# Patient Record
Sex: FEMALE | Race: BLACK OR AFRICAN AMERICAN | URBAN - METROPOLITAN AREA
[De-identification: names, ages, dates, MRNs, and addresses within clinical notes are randomized per-mention and may not be internally consistent; named-entity substitution may affect disease eponyms.]

---

## 2022-11-28 ENCOUNTER — APPOINTMENT (OUTPATIENT)
Dept: ULTRASOUND IMAGING | Age: 42
End: 2022-11-28
Payer: COMMERCIAL

## 2022-11-28 ENCOUNTER — APPOINTMENT (OUTPATIENT)
Dept: CT IMAGING | Age: 42
End: 2022-11-28
Payer: COMMERCIAL

## 2022-11-28 ENCOUNTER — HOSPITAL ENCOUNTER (EMERGENCY)
Age: 42
Discharge: HOME | End: 2022-11-28
Attending: EMERGENCY MEDICINE
Payer: COMMERCIAL

## 2022-11-28 VITALS
SYSTOLIC BLOOD PRESSURE: 132 MMHG | OXYGEN SATURATION: 100 % | RESPIRATION RATE: 16 BRPM | HEART RATE: 65 BPM | TEMPERATURE: 97.1 F | DIASTOLIC BLOOD PRESSURE: 88 MMHG

## 2022-11-28 DIAGNOSIS — R93.89 ENDOMETRIAL STRIPE INCREASED: ICD-10-CM

## 2022-11-28 DIAGNOSIS — R10.32 ABDOMINAL PAIN, LEFT LOWER QUADRANT: Primary | ICD-10-CM

## 2022-11-28 LAB
A/G RATIO: 1.2 (ref 1.1–2.2)
ALBUMIN SERPL-MCNC: 4.2 G/DL (ref 3.4–5)
ALP BLD-CCNC: 72 U/L (ref 40–129)
ALT SERPL-CCNC: 20 U/L (ref 10–40)
ANION GAP SERPL CALCULATED.3IONS-SCNC: 12 MMOL/L (ref 3–16)
AST SERPL-CCNC: 19 U/L (ref 15–37)
BACTERIA: ABNORMAL /HPF
BASOPHILS ABSOLUTE: 0.1 K/UL (ref 0–0.2)
BASOPHILS RELATIVE PERCENT: 0.7 %
BILIRUB SERPL-MCNC: 0.6 MG/DL (ref 0–1)
BILIRUBIN URINE: NEGATIVE
BLOOD, URINE: NEGATIVE
BUN BLDV-MCNC: 9 MG/DL (ref 7–20)
CALCIUM SERPL-MCNC: 9.1 MG/DL (ref 8.3–10.6)
CHLORIDE BLD-SCNC: 103 MMOL/L (ref 99–110)
CLARITY: ABNORMAL
CO2: 23 MMOL/L (ref 21–32)
COLOR: ABNORMAL
CREAT SERPL-MCNC: 0.7 MG/DL (ref 0.6–1.1)
EOSINOPHILS ABSOLUTE: 0.1 K/UL (ref 0–0.6)
EOSINOPHILS RELATIVE PERCENT: 0.6 %
EPITHELIAL CELLS, UA: 16 /HPF (ref 0–5)
GFR SERPL CREATININE-BSD FRML MDRD: >60 ML/MIN/{1.73_M2}
GLUCOSE BLD-MCNC: 101 MG/DL (ref 70–99)
GLUCOSE URINE: NEGATIVE MG/DL
HCG(URINE) PREGNANCY TEST: NEGATIVE
HCT VFR BLD CALC: 43 % (ref 36–48)
HEMOGLOBIN: 14.3 G/DL (ref 12–16)
KETONES, URINE: ABNORMAL MG/DL
LEUKOCYTE ESTERASE, URINE: NEGATIVE
LIPASE: 20 U/L (ref 13–60)
LYMPHOCYTES ABSOLUTE: 2 K/UL (ref 1–5.1)
LYMPHOCYTES RELATIVE PERCENT: 14.1 %
MCH RBC QN AUTO: 30.3 PG (ref 26–34)
MCHC RBC AUTO-ENTMCNC: 33.3 G/DL (ref 31–36)
MCV RBC AUTO: 91.2 FL (ref 80–100)
MICROSCOPIC EXAMINATION: YES
MONOCYTES ABSOLUTE: 0.9 K/UL (ref 0–1.3)
MONOCYTES RELATIVE PERCENT: 6.3 %
NEUTROPHILS ABSOLUTE: 11.2 K/UL (ref 1.7–7.7)
NEUTROPHILS RELATIVE PERCENT: 78.3 %
NITRITE, URINE: NEGATIVE
PDW BLD-RTO: 14 % (ref 12.4–15.4)
PH UA: 5.5 (ref 5–8)
PLATELET # BLD: 233 K/UL (ref 135–450)
PMV BLD AUTO: 9.1 FL (ref 5–10.5)
POTASSIUM SERPL-SCNC: 4.1 MMOL/L (ref 3.5–5.1)
PROTEIN UA: 30 MG/DL
RBC # BLD: 4.71 M/UL (ref 4–5.2)
RBC UA: 2 /HPF (ref 0–4)
SODIUM BLD-SCNC: 138 MMOL/L (ref 136–145)
SPECIFIC GRAVITY UA: 1.03 (ref 1–1.03)
TOTAL PROTEIN: 7.6 G/DL (ref 6.4–8.2)
URINE REFLEX TO CULTURE: ABNORMAL
URINE TYPE: ABNORMAL
UROBILINOGEN, URINE: 1 E.U./DL
WBC # BLD: 14.3 K/UL (ref 4–11)
WBC UA: 6 /HPF (ref 0–5)

## 2022-11-28 PROCEDURE — 6360000004 HC RX CONTRAST MEDICATION: Performed by: EMERGENCY MEDICINE

## 2022-11-28 PROCEDURE — 99285 EMERGENCY DEPT VISIT HI MDM: CPT

## 2022-11-28 PROCEDURE — 81001 URINALYSIS AUTO W/SCOPE: CPT

## 2022-11-28 PROCEDURE — 80053 COMPREHEN METABOLIC PANEL: CPT

## 2022-11-28 PROCEDURE — 83690 ASSAY OF LIPASE: CPT

## 2022-11-28 PROCEDURE — 84703 CHORIONIC GONADOTROPIN ASSAY: CPT

## 2022-11-28 PROCEDURE — 74177 CT ABD & PELVIS W/CONTRAST: CPT

## 2022-11-28 PROCEDURE — 76856 US EXAM PELVIC COMPLETE: CPT

## 2022-11-28 PROCEDURE — 85025 COMPLETE CBC W/AUTO DIFF WBC: CPT

## 2022-11-28 RX ORDER — DICYCLOMINE HCL 20 MG
20 TABLET ORAL 4 TIMES DAILY PRN
Qty: 30 TABLET | Refills: 0 | Status: SHIPPED | OUTPATIENT
Start: 2022-11-28

## 2022-11-28 RX ADMIN — IOPAMIDOL 75 ML: 755 INJECTION, SOLUTION INTRAVENOUS at 12:33

## 2022-11-28 ASSESSMENT — PAIN SCALES - GENERAL
PAINLEVEL_OUTOF10: 4
PAINLEVEL_OUTOF10: 4

## 2022-11-28 ASSESSMENT — PAIN DESCRIPTION - PAIN TYPE
TYPE: CHRONIC PAIN
TYPE: CHRONIC PAIN

## 2022-11-28 ASSESSMENT — PAIN - FUNCTIONAL ASSESSMENT
PAIN_FUNCTIONAL_ASSESSMENT: 0-10
PAIN_FUNCTIONAL_ASSESSMENT: 0-10

## 2022-11-28 ASSESSMENT — PAIN DESCRIPTION - LOCATION
LOCATION: ABDOMEN
LOCATION: ABDOMEN

## 2022-11-28 NOTE — ED PROVIDER NOTES
[address] [facility]  eMERGENCY dEPARTMENT eNCOUnter      Pt Name: Kentrell De eLon  MRN: [id number]  [place] 1980  Date of evaluation: 11/28/2022  Provider: Lizzy Jamison MD    formerly [facility]       Chief Complaint   Patient presents with    Abdominal Pain     Lower, ongoing issue     Back Pain     Lower back          CRITICAL CARE TIME   Total Critical Care time was 0 minutes, excluding separately reportable procedures. There was a high probability of clinically significant/life threatening deterioration in the patient's condition which required my urgent intervention. HISTORY OF PRESENT ILLNESS  (Location/Symptom, Timing/Onset, Context/Setting, Quality, Duration, Modifying Factors, Severity.)   Kentrell De Leon is a 38 y.o. female who presents to the emergency department complaining of left lower abdominal and left low back pain. She states has been going on for about 3 months. She notices on a daily basis. It seems to be getting worse. She states her bowel movements have never been regular. She had a bowel movement yesterday. She states sometimes they are hard, she has trouble with constipation. She states her last week and a half or so they have been fairly normal.  She states she saw her primary care physician. She has been referred to a GI doctor. She states she believes she has an appointment the end of this week. Since her symptoms were worse she decided to come in. She is status postcholecystectomy. She has had C-sections x3. She had an ablation. She has no frequency urgency or dysuria. Nursing Notes were reviewed and I agree. REVIEW OF SYSTEMS    (2-9 systems for level 4, 10 or more for level 5)     All: No fever or chills. HEENT: No earache rhinorrhea or sore throat. Cardiovascular: No chest pain. Pulmonary: No shortness of breath. GI: Left lower abdominal pain, somewhat crampy, occurs daily, some left lower back discomfort.   No nausea or vomiting. : No frequency urgency or dysuria. Status post ablation, no menses. Musculoskeletal: Left lower back pain. Nonradiating. Except as noted above the remainder of the review of systems was reviewed and negative. PAST MEDICAL HISTORY     Past Medical History:   Diagnosis Date    Diabetes mellitus ([address]     gestational diabetic last 2 pregnancies    UTI (lower urinary tract infection)          SURGICAL HISTORY       Past Surgical History:   Procedure Laterality Date    BREAST REDUCTION SURGERY       SECTION  ,    CHOLECYSTECTOMY, LAPAROSCOPIC      WISDOM TOOTH EXTRACTION           CURRENT MEDICATIONS       Previous Medications    FAMOTIDINE (PEPCID) 40 MG TABLET    Take 1 tablet by mouth daily       ALLERGIES     Patient has no known allergies. FAMILY HISTORY     No family history on file. SOCIAL HISTORY       Social History     Socioeconomic History    Marital status: Single   Tobacco Use    Smoking status: Never   Substance and Sexual Activity    Alcohol use: No    Drug use: No         PHYSICAL EXAM    (up to 7 for level 4, 8 or more for level 5)     ED Triage Vitals [22 1057]   BP Temp Temp Source Heart Rate Resp SpO2 Height Weight   -- 97.1 °F (36.2 °C) Temporal 70 17 99 % -- --       Alert moderately obese black female no acute distress. HEENT: Atraumatic. Pupils equal round reactive. No icterus. Nose is clear. Oropharynx moist without erythema. Neck: Supple, nontender, no adenopathy. Heart: Regular rate and rhythm. No murmurs or gallops noted. Lungs: Breath sounds equal bilaterally and clear. Abdomen: Obese, soft, mild poorly localized left lower abdominal tenderness. No guarding or rebound. No masses organomegaly. Bowel sounds are normal.  No flank tenderness. Skin: Warm and dry, good turgor. No pallor or cyanosis. No diaphoresis. Neuro: Awake, alert, oriented. No focal motor deficits. Normal gait.       DIFFERENTIAL DIAGNOSIS   Differential includes but is not limited to diverticulitis, ureterolithiasis, pyelonephritis, irritable bowel syndrome, constipation, other. DIAGNOSTIC RESULTS     EKG: All EKG's are interpreted by Moy Knapp MD in the absence of a cardiologist.      RADIOLOGY:   Non-plain film images such as CT, Ultrasound and MRI are read by the radiologist. Plain radiographic images are visualized and preliminarily interpreted Moy Knapp MD with the below findings:        Interpretation per the Radiologist below, if available at the time of this note:    US PELVIS COMPLETE   Preliminary Result   Endometrial stripe is thickened, measuring 19 mm. No increased internal   vascularity is seen. Consider a follow-up ultrasound in 6-12 weeks. 1.9 x 2.8 x 2.4 cm uterine fibroid. Unremarkable ultrasound appearance of the ovaries with normal color Doppler   flow. No significant free fluid is seen. CT ABDOMEN PELVIS W IV CONTRAST Additional Contrast? None   Final Result   Fluid in the fundal portion of the stripe and adjacent anterior myometrium. There is also adjacent enhancement of the submucosa and myometrium. Endometritis is considered. However, the fluid may be physiological and   enhancement pattern of the uterus is nonspecific. Small amount of free fluid in the pelvis. No extra uterine abscess is   identified.                ED BEDSIDE ULTRASOUND:   Performed by ED Physician - none    LABS:  Labs Reviewed   CBC WITH AUTO DIFFERENTIAL - Abnormal; Notable for the following components:       Result Value    WBC 14.3 (*)     Neutrophils Absolute 11.2 (*)     All other components within normal limits   COMPREHENSIVE METABOLIC PANEL - Abnormal; Notable for the following components:    Glucose 101 (*)     All other components within normal limits   URINALYSIS WITH REFLEX TO CULTURE - Abnormal; Notable for the following components:    Color, UA DARK YELLOW (*)     Clarity, UA CLOUDY (*) Ketones, Urine TRACE (*)     Protein, UA 30 (*)     All other components within normal limits   MICROSCOPIC URINALYSIS - Abnormal; Notable for the following components:    Bacteria, UA 1+ (*)     WBC, UA 6 (*)     Epithelial Cells, UA 16 (*)     All other components within normal limits   LIPASE   PREGNANCY, URINE       All other labs were within normal range or not returned as of this dictation. EMERGENCY DEPARTMENT COURSE and DIFFERENTIAL DIAGNOSIS/MDM:   Vitals:    Vitals:    11/28/22 1057 11/28/22 1532   BP: 107/69 132/88   Pulse: 70 65   Resp: 17 16   Temp: 97.1 °F (36.2 °C)    TempSrc: Temporal    SpO2: 99% 100%       Patient presents with symptoms as above. Is been going on for several months. She has a referral to a GI physician, she has an appointment on Friday. No  symptoms. She has had an ablation done. She does not have menses. Urinalysis shows 6 white cells with some ileal cells, likely contaminated specimen. Her H&H is stable. Her white blood cell count is elevated. The only abnormality noted on her CT abdomen pelvis was an enlarged endometrial stripe with some question of some inflammatory changes. Her ultrasound showed an enlarged endometrial stripe. She has no significant tenderness in the suprapubic area. She has no vaginal discharge. She has no vaginal bleeding. I think this can be followed up as an outpatient with her gynecologist at the [facility] clinic. She has a scheduled appoint with her GI physician on Friday. Further work-up for left lower quadrant abdominal pain at that time. I am going to put her on Bentyl for her abdominal discomfort. Test results, diagnosis, and treatment plan were discussed the patient. She understands treatment plan follow-up as discussed        CONSULTS:  None    PROCEDURES:  None    FINAL IMPRESSION      1. Abdominal pain, left lower quadrant    2.  Endometrial stripe increased          DISPOSITION/PLAN   DISPOSITION Decision To Discharge 11/28/2022 03:52:31 PM      PATIENT REFERRED TO:  [facility] [address] / GYN    Schedule an appointment as soon as possible for a visit in 1 week  Enlarged uterine lining    GI    In 4 days  as scheduled    DISCHARGE MEDICATIONS:  New Prescriptions    DICYCLOMINE (BENTYL) 20 MG TABLET    Take 1 tablet by mouth 4 times daily as needed (abd pain)       (Please note that portions of this note were completed with a voice recognition program.  Efforts were made to edit the dictations but occasionally words are mis-transcribed.)    Lila Brunner MD  Attending Emergency Physician        Chitra Lobtao MD  11/28/22 [id number]

## 2022-11-28 NOTE — DISCHARGE INSTRUCTIONS
Take Bentyl As needed for pain. Keep your scheduled appointment with your GI physician on Friday.     Make an appointment with your gynecologist at [facility] clinic for follow-up for your abnormal ultrasound/enlarged endometrial stripe

## 2023-08-10 ENCOUNTER — APPOINTMENT (OUTPATIENT)
Dept: GENERAL RADIOLOGY | Age: 43
End: 2023-08-10
Payer: COMMERCIAL

## 2023-08-10 ENCOUNTER — HOSPITAL ENCOUNTER (EMERGENCY)
Age: 43
Discharge: HOME | End: 2023-08-10
Payer: COMMERCIAL

## 2023-08-10 VITALS
OXYGEN SATURATION: 99 % | RESPIRATION RATE: 17 BRPM | HEART RATE: 55 BPM | WEIGHT: 212.74 LBS | TEMPERATURE: 97.7 F | BODY MASS INDEX: 37.7 KG/M2 | HEIGHT: 63 IN | DIASTOLIC BLOOD PRESSURE: 64 MMHG | SYSTOLIC BLOOD PRESSURE: 116 MMHG

## 2023-08-10 DIAGNOSIS — L03.011 PARONYCHIA OF FINGER OF RIGHT HAND: Primary | ICD-10-CM

## 2023-08-10 PROCEDURE — 26010 DRAINAGE OF FINGER ABSCESS: CPT

## 2023-08-10 PROCEDURE — 99283 EMERGENCY DEPT VISIT LOW MDM: CPT

## 2023-08-10 PROCEDURE — 73140 X-RAY EXAM OF FINGER(S): CPT

## 2023-08-10 RX ORDER — BACITRACIN ZINC AND POLYMYXIN B SULFATE 500; 1000 [USP'U]/G; [USP'U]/G
OINTMENT TOPICAL
Qty: 28.4 G | Refills: 0 | Status: SHIPPED | OUTPATIENT
Start: 2023-08-10 | End: 2023-08-17

## 2023-08-10 RX ORDER — DOXYCYCLINE HYCLATE 100 MG
100 TABLET ORAL 2 TIMES DAILY
Qty: 20 TABLET | Refills: 0 | Status: SHIPPED | OUTPATIENT
Start: 2023-08-10 | End: 2023-08-20

## 2023-08-10 RX ORDER — AMOXICILLIN AND CLAVULANATE POTASSIUM 875; 125 MG/1; MG/1
1 TABLET, FILM COATED ORAL 2 TIMES DAILY
Qty: 20 TABLET | Refills: 0 | Status: SHIPPED | OUTPATIENT
Start: 2023-08-10 | End: 2023-08-20

## 2023-08-10 ASSESSMENT — PAIN SCALES - GENERAL
PAINLEVEL_OUTOF10: 8
PAINLEVEL_OUTOF10: 6

## 2023-08-10 ASSESSMENT — PAIN - FUNCTIONAL ASSESSMENT: PAIN_FUNCTIONAL_ASSESSMENT: 0-10

## 2023-08-10 ASSESSMENT — LIFESTYLE VARIABLES
HOW MANY STANDARD DRINKS CONTAINING ALCOHOL DO YOU HAVE ON A TYPICAL DAY: 1 OR 2
HOW OFTEN DO YOU HAVE A DRINK CONTAINING ALCOHOL: MONTHLY OR LESS

## 2023-08-10 ASSESSMENT — PAIN DESCRIPTION - ORIENTATION: ORIENTATION: RIGHT

## 2023-08-10 ASSESSMENT — PAIN DESCRIPTION - DESCRIPTORS: DESCRIPTORS: THROBBING;SHARP

## 2023-08-10 ASSESSMENT — PAIN DESCRIPTION - LOCATION: LOCATION: FINGER (COMMENT WHICH ONE)

## 2023-08-10 NOTE — ED PROVIDER NOTES
[address] [facility] [address]  Dept: [phone]  Loc: [address] [facility] ENCOUNTER        This patient was not seen or evaluated by the attending physician. I evaluated this patient, the attending physician was available for consultation. CHIEF COMPLAINT    Chief Complaint   Patient presents with    Finger Pain     Onset a couple weeks ago went to urgent care DX right middle finger infection from a hang nail per patient and given ABX and told to soak in Epson salt water; over the past week after ABX finished pain has increased and moved down the finger and complaining of numbness/tingling and sharp pain when touched; denies fever, chills, SOB or chest pain;  denies n/v/d; able to move finger w/o difficulties for the most pain;       HPI    Raad Magallanes is a 41 y.o. female who presents with an area of erythema and fluctuance along the nail of the right middle digit. Onset was few weeks ago. She was seen in urgent care a week ago, started on Keflex but no I&D was performed. She has been soaking the finger in Epsom salt 3 times a day. However is gotten worse, she now has an area of fluctuance/abscess developed. The duration has been constant since the onset. The patient has associated pain. The pain severity is 6/10. There are no alleviating factors. The patient denies any fevers or chills. Came to the ED for further evaluation and treatment.     REVIEW OF SYSTEMS    Constitutional: no fever, chills or sweats  Respiratory:  No shortness of breath   Skin: see HPI    PAST MEDICAL HISTORY/SURGICAL HISTORY     Past Medical History:   Diagnosis Date    Diabetes mellitus ([address])     gestational diabetic last 2 pregnancies    UTI (lower urinary tract infection)      Past Surgical History:   Procedure Laterality Date    BREAST REDUCTION SURGERY       SECTION  5447,3247    CHOLECYSTECTOMY, LAPAROSCOPIC tingling distally. Came to the ED for further evaluation and treatment    Vitals upon arrival show normotensive, afebrile and hemodynamically stable. Nontoxic in appearance. Physical Exam:   Integument:  +fluctuance, erythema and tenderness located along the lateral fold of the right middle digit. No overt felon present. No red streaking up the finger nor in the hand  Vascular: radial pulse 2+ on the affected side     Medications ordered: Bupivacaine 0.5% without epinephrine for the digital block, see procedure, injected intradermally    Ddx includes: felon, cellulitis, gout, osteomyelitis, other    Blood work was done and shows:   N/A    Covid/Flu: N/A    Urinalysis: N/A    EKG per my interpretation: N/A    Imaging was reviewed and interpreted by radiologist as above showing:   -Soft tissue swelling consistent with her paronychia without any evidence of underlying osseous abnormality as interpreted per myself and confirmed with radiology read    Imaging was independently reviewed by myself. Consults: n.a    Reassessment: Patient tolerated procedure's well. Anesthesia via digital block was achieved, the paronychia was excised and drained. Clean dry and intact dressing applied. She will be started on Augmentin for floral and MRSA coverage, with doxycycline for MSSA coverage. Will cover her for 10-day course of antibiotics. Told to use warm compresses on the site. It may continue to drain as it was left open. It was not packed. She verbalized understanding for strict return parameters back to the ED for any new or worsening symptoms but otherwise can follow-up with her PCP.   She is in agreement with this plan as well as plan of discharge through shared decision making conversation with patient    Disposition: discharge     I estimate there is LOW risk for COMPARTMENT SYNDROME, DEEP VENOUS THROMBOSIS, SEPTIC ARTHRITIS, TENDON OR NEUROVASCULAR INJURY, thus I consider the discharge disposition

## 2023-08-10 NOTE — ED TRIAGE NOTES
Patient arrived to the ED ambulatory from home w/ complaints of finger infection on the right middle finger. Patient/EMS reports states Onset a couple weeks ago went to urgent care DX right middle finger infection from a hang nail per patient and given ABX and told to soak in Epson salt water; over the past week after ABX finished pain has increased and moved down the finger and complaining of numbness/tingling and sharp pain when touched; denies fever, chills, SOB or chest pain;  denies n/v/d; able to move finger w/o difficulties for the most pain;     Patient A&O x 4, VSS ,

## 2023-12-29 ENCOUNTER — HOSPITAL ENCOUNTER (EMERGENCY)
Age: 43
Discharge: HOME | End: 2023-12-29
Attending: EMERGENCY MEDICINE
Payer: COMMERCIAL

## 2023-12-29 ENCOUNTER — APPOINTMENT (OUTPATIENT)
Dept: GENERAL RADIOLOGY | Age: 43
End: 2023-12-29
Payer: COMMERCIAL

## 2023-12-29 VITALS
SYSTOLIC BLOOD PRESSURE: 156 MMHG | DIASTOLIC BLOOD PRESSURE: 72 MMHG | BODY MASS INDEX: 40.82 KG/M2 | HEIGHT: 63 IN | HEART RATE: 50 BPM | RESPIRATION RATE: 16 BRPM | TEMPERATURE: 97.9 F | WEIGHT: 230.38 LBS | OXYGEN SATURATION: 100 %
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
## 2023-12-29 DIAGNOSIS — M25.561 ACUTE PAIN OF RIGHT KNEE: Primary | ICD-10-CM

## 2023-12-29 PROCEDURE — 6370000000 HC RX 637 (ALT 250 FOR IP): Performed by: EMERGENCY MEDICINE

## 2023-12-29 PROCEDURE — 99283 EMERGENCY DEPT VISIT LOW MDM: CPT

## 2023-12-29 PROCEDURE — 73562 X-RAY EXAM OF KNEE 3: CPT

## 2023-12-29 RX ORDER — IBUPROFEN 400 MG/1
400 TABLET ORAL ONCE
Status: COMPLETED | OUTPATIENT
Start: 2023-12-29 | End: 2023-12-29

## 2023-12-29 RX ORDER — MELOXICAM 7.5 MG/1
7.5-15 TABLET ORAL DAILY
Qty: 60 TABLET | Refills: 0 | Status: SHIPPED | OUTPATIENT
Start: 2023-12-29 | End: 2024-01-28

## 2023-12-29 RX ORDER — LIDOCAINE 4 G/G
1 PATCH TOPICAL ONCE
Status: DISCONTINUED | OUTPATIENT
Start: 2023-12-29 | End: 2023-12-29 | Stop reason: HOSPADM

## 2023-12-29 RX ORDER — ACETAMINOPHEN 325 MG/1
650 TABLET ORAL ONCE
Status: COMPLETED | OUTPATIENT
Start: 2023-12-29 | End: 2023-12-29

## 2023-12-29 RX ORDER — LIDOCAINE 50 MG/G
1 PATCH TOPICAL DAILY
Qty: 10 PATCH | Refills: 0 | Status: SHIPPED | OUTPATIENT
Start: 2023-12-29 | End: 2024-01-08

## 2023-12-29 RX ADMIN — ACETAMINOPHEN 650 MG: 325 TABLET ORAL at 13:31

## 2023-12-29 RX ADMIN — IBUPROFEN 400 MG: 400 TABLET, FILM COATED ORAL at 13:31

## 2023-12-29 ASSESSMENT — PAIN - FUNCTIONAL ASSESSMENT
PAIN_FUNCTIONAL_ASSESSMENT: NONE - DENIES PAIN
PAIN_FUNCTIONAL_ASSESSMENT: NONE - DENIES PAIN

## 2023-12-29 ASSESSMENT — LIFESTYLE VARIABLES: HOW OFTEN DO YOU HAVE A DRINK CONTAINING ALCOHOL: NEVER

## 2023-12-29 NOTE — DISCHARGE INSTRUCTIONS
Your x-ray today showed no signs of fracture or dislocation. We have given you a referral to orthopedics. We have also prescribed you medication, meloxicam which is a stronger version of ibuprofen you take once a day for pain. We have also prescribed you lidocaine patches 5%. The 4% are available over-the-counter. Continue to ice and elevate. Lastly we talked about following up with orthopedics.

## 2023-12-29 NOTE — ED PROVIDER NOTES
[address] [facility] ENCOUNTER        Pt Name: Suzette Dupnot  MRN: [id number]  [address] [facility] 1980  Date of evaluation: 2023  Provider: Sandra Lopez MD  PCP: Melina Mayfield MD  Note Started: 12:39 PM EST 23    CHIEF COMPLAINT     My knee  HISTORY OF PRESENT ILLNESS: 1 or more Elements     Chief Complaint   Patient presents with    Knee Injury     Right knee pain from mechanical fall yesterday evening      History from : Patient  Limitations to history : None    Suzette Dupont is a 41 y.o. female who presents to the emergency department secondary to concern for right knee pain. She states that she fell yesterday while walking in slides going out to walk the dogs. She states it hurt really bad but she finished walking the dog. She also took them on a second walk later. She states after the second walk the pain got worse. She did get a knee brace today as well as taking 800 mg of ibuprofen, ice, and elevating. This helped a little bit. However since her symptoms seem to be worsening she wanted to get checked out. She has not previously injured this knee. No numbness no tingling. Past medical history noted below. Denies smoking. Aside from what is stated above denies any other symptoms or modifying factors. Nursing Notes were all reviewed and agreed with or any disagreements addressed in HPI/MDM.   REVIEW OF SYSTEMS :    Review of Systems Pertinent positive and negative findings as documented in the HPI  PAST MEDICAL HISTORY     Past Medical History:   Diagnosis Date    Diabetes mellitus ([address])     gestational diabetic last 2 pregnancies    UTI (lower urinary tract infection)        SURGICALHISTORY       Past Surgical History:   Procedure Laterality Date    BREAST REDUCTION SURGERY       SECTION  ,    CHOLECYSTECTOMY, LAPAROSCOPIC      WISDOM TOOTH EXTRACTION       CURRENT MEDICATIONS       Previous Medications    DICYCLOMINE

## 2024-01-10 ENCOUNTER — OFFICE VISIT (OUTPATIENT)
Dept: ORTHOPEDIC SURGERY | Age: 44
End: 2024-01-10
Payer: COMMERCIAL

## 2024-01-10 VITALS — HEIGHT: 63 IN | BODY MASS INDEX: 40.75 KG/M2 | WEIGHT: 230 LBS

## 2024-01-10 DIAGNOSIS — S83.412A SPRAIN OF MEDIAL COLLATERAL LIGAMENT OF LEFT KNEE, INITIAL ENCOUNTER: Primary | ICD-10-CM

## 2024-01-10 PROCEDURE — G8484 FLU IMMUNIZE NO ADMIN: HCPCS | Performed by: PHYSICIAN ASSISTANT

## 2024-01-10 PROCEDURE — G8417 CALC BMI ABV UP PARAM F/U: HCPCS | Performed by: PHYSICIAN ASSISTANT

## 2024-01-10 PROCEDURE — 1036F TOBACCO NON-USER: CPT | Performed by: PHYSICIAN ASSISTANT

## 2024-01-10 PROCEDURE — 99203 OFFICE O/P NEW LOW 30 MIN: CPT | Performed by: PHYSICIAN ASSISTANT

## 2024-01-10 PROCEDURE — G8427 DOCREV CUR MEDS BY ELIG CLIN: HCPCS | Performed by: PHYSICIAN ASSISTANT

## 2024-01-12 NOTE — PROGRESS NOTES
pain) 30 tablet 0    famotidine (PEPCID) 40 MG tablet Take 1 tablet by mouth daily 30 tablet 0    meloxicam (MOBIC) 7.5 MG tablet Take 1-2 tablets by mouth daily (Patient not taking: Reported on 1/10/2024) 60 tablet 0     No current facility-administered medications for this visit.           Objective:   She  is alert, oriented x 3, pleasant, well nourished, developed and in no acute distress.    Ht 1.6 m (5' 3\")   Wt 104.3 kg (230 lb)   BMI 40.74 kg/m²      Examination of the right knee:   Inspection of the skin reveals no unusual erythema.  Knee alignment is neutral.  Gait- Antalgic.  There is no intra-articular effusion.  ROM: Extension- 5 . Flexion- 125.  There is moderate pain associated with ROM testing.   Medial joint .   Lateral joint line  non-tender.   Pes anserine bursa non-tender.   Patellar tendon non-tender.  Quadriceps tendon non-tender.  MCL tender.  LCL non-tender.  Popliteal fossa non-tender.  Varus Stress testing negative.   Valgus Stress testing negative.   Lachman's test negative.  Anterior Drawer test negative.   Posterior Drawer test negative.   Sivakumar's Test negative.    Patellar Compression testing negative.  Extensor Mechanism is intact.       X Rays: not performed in the office today:   I personally reviewed x-rays dated 12/29/2023 of the right knee.  No acute abnormality.  Diagnosis:       ICD-10-CM    1. Sprain of medial collateral ligament of left knee, initial encounter  S83.412A            Assessment/ Plan:       Assessment:  Sprain MCL right knee probable grade 1.    I had an extensive discussion with Ms. Erna Franco and/or family regarding the natural history, etiology, and long term consequences of her condition.   I have presented reasonable alternatives to the patient's proposed care, treatment, and services.  Risks and benefits of the treatment options also reviewed in detail. I have outlined a treatment plan with them. She has had full opportunity to ask her

## 2024-05-06 ENCOUNTER — OFFICE VISIT (OUTPATIENT)
Dept: ORTHOPEDIC SURGERY | Age: 44
End: 2024-05-06
Payer: COMMERCIAL

## 2024-05-06 VITALS — BODY MASS INDEX: 40.75 KG/M2 | HEIGHT: 63 IN | WEIGHT: 230 LBS

## 2024-05-06 DIAGNOSIS — S83.411A SPRAIN OF MEDIAL COLLATERAL LIGAMENT OF RIGHT KNEE, INITIAL ENCOUNTER: Primary | ICD-10-CM

## 2024-05-06 PROCEDURE — G8427 DOCREV CUR MEDS BY ELIG CLIN: HCPCS | Performed by: PHYSICIAN ASSISTANT

## 2024-05-06 PROCEDURE — 1036F TOBACCO NON-USER: CPT | Performed by: PHYSICIAN ASSISTANT

## 2024-05-06 PROCEDURE — 99213 OFFICE O/P EST LOW 20 MIN: CPT | Performed by: PHYSICIAN ASSISTANT

## 2024-05-06 PROCEDURE — G8417 CALC BMI ABV UP PARAM F/U: HCPCS | Performed by: PHYSICIAN ASSISTANT

## 2024-05-06 NOTE — PROGRESS NOTES
Subjective:      Patient ID: Erna Franco is a 44 y.o. female who is here for follow up evaluation of her right knee.  She had been initially evaluated for right knee pain 1/10/2024 after a fall with right knee tucked behind her.  She is still having complaints of right medial knee pain.  She has had a couple of episodes of catching in the right knee.  She complains of pain over the medial joint line.      Review Of Systems:   Constitutional: denies fever, chills, weight loss.  MSK: denies pain in other joints, muscle aches.  Neurological: denies numbness, tingling, weakness.       Past Medical History:   Diagnosis Date    Diabetes mellitus ([facility])     gestational diabetic last 2 pregnancies    UTI (lower urinary tract infection)        History reviewed. No pertinent family history.    Past Surgical History:   Procedure Laterality Date    BREAST REDUCTION SURGERY       SECTION  ,    CHOLECYSTECTOMY, LAPAROSCOPIC      WISDOM TOOTH EXTRACTION         Social History     Occupational History    Not on file   Tobacco Use    Smoking status: Never    Smokeless tobacco: Not on file   Substance and Sexual Activity    Alcohol use: No    Drug use: No    Sexual activity: Not on file       Current Outpatient Medications   Medication Sig Dispense Refill    dicyclomine (BENTYL) 20 MG tablet Take 1 tablet by mouth 4 times daily as needed (abd pain) 30 tablet 0    famotidine (PEPCID) 40 MG tablet Take 1 tablet by mouth daily 30 tablet 0    meloxicam (MOBIC) 7.5 MG tablet Take 1-2 tablets by mouth daily (Patient not taking: Reported on 1/10/2024) 60 tablet 0     No current facility-administered medications for this visit.         Objective:     She is alert, oriented x 3, pleasant, well nourished, developed and in no   acute distress.     Ht 1.6 m (5' 3\")   Wt 104.3 kg (230 lb)   BMI 40.74 kg/m²      Examination of the left knee:   Inspection of the skin reveals no unusual erythema.  Knee alignment is neutral.  Gait-

## 2024-08-09 ENCOUNTER — HOSPITAL ENCOUNTER (EMERGENCY)
Age: 44
Discharge: HOME | End: 2024-08-09
Attending: EMERGENCY MEDICINE
Payer: COMMERCIAL

## 2024-08-09 VITALS
SYSTOLIC BLOOD PRESSURE: 123 MMHG | WEIGHT: 217.59 LBS | DIASTOLIC BLOOD PRESSURE: 73 MMHG | HEIGHT: 63 IN | OXYGEN SATURATION: 100 % | HEART RATE: 78 BPM | TEMPERATURE: 97.8 F | RESPIRATION RATE: 19 BRPM | BODY MASS INDEX: 38.55 KG/M2

## 2024-08-09 DIAGNOSIS — R11.2 NAUSEA AND VOMITING, UNSPECIFIED VOMITING TYPE: ICD-10-CM

## 2024-08-09 DIAGNOSIS — U07.1 COVID: Primary | ICD-10-CM

## 2024-08-09 DIAGNOSIS — I49.3 PVC (PREMATURE VENTRICULAR CONTRACTION): ICD-10-CM

## 2024-08-09 DIAGNOSIS — R19.7 DIARRHEA, UNSPECIFIED TYPE: ICD-10-CM

## 2024-08-09 LAB
ALBUMIN SERPL-MCNC: 4 G/DL (ref 3.4–5)
ALBUMIN/GLOB SERPL: 1.3 {RATIO} (ref 1.1–2.2)
ALP SERPL-CCNC: 73 U/L (ref 40–129)
ALT SERPL-CCNC: 28 U/L (ref 10–40)
AMORPH SED URNS QL MICRO: ABNORMAL /HPF
ANION GAP SERPL CALCULATED.3IONS-SCNC: 11 MMOL/L (ref 3–16)
AST SERPL-CCNC: 23 U/L (ref 15–37)
BASOPHILS # BLD: 0.1 K/UL (ref 0–0.2)
BASOPHILS NFR BLD: 0.7 %
BILIRUB SERPL-MCNC: 0.3 MG/DL (ref 0–1)
BILIRUB UR QL STRIP.AUTO: ABNORMAL
BUN SERPL-MCNC: 10 MG/DL (ref 7–20)
CALCIUM SERPL-MCNC: 8.7 MG/DL (ref 8.3–10.6)
CHLORIDE SERPL-SCNC: 103 MMOL/L (ref 99–110)
CLARITY UR: ABNORMAL
CO2 SERPL-SCNC: 23 MMOL/L (ref 21–32)
COLOR UR: YELLOW
CREAT SERPL-MCNC: 0.7 MG/DL (ref 0.6–1.1)
DEPRECATED RDW RBC AUTO: 14 % (ref 12.4–15.4)
EKG ATRIAL RATE: 78 BPM
EKG DIAGNOSIS: NORMAL
EKG P AXIS: 23 DEGREES
EKG P-R INTERVAL: 130 MS
EKG Q-T INTERVAL: 410 MS
EKG QRS DURATION: 88 MS
EKG QTC CALCULATION (BAZETT): 467 MS
EKG R AXIS: 54 DEGREES
EKG T AXIS: 38 DEGREES
EKG VENTRICULAR RATE: 78 BPM
EOSINOPHIL # BLD: 0 K/UL (ref 0–0.6)
EOSINOPHIL NFR BLD: 0.4 %
EPI CELLS #/AREA URNS HPF: ABNORMAL /HPF (ref 0–5)
GFR SERPLBLD CREATININE-BSD FMLA CKD-EPI: >90 ML/MIN/{1.73_M2}
GLUCOSE SERPL-MCNC: 99 MG/DL (ref 70–99)
GLUCOSE UR STRIP.AUTO-MCNC: NEGATIVE MG/DL
HCG UR QL: NEGATIVE
HCT VFR BLD AUTO: 49.6 % (ref 36–48)
HGB BLD-MCNC: 17.4 G/DL (ref 12–16)
HGB UR QL STRIP.AUTO: NEGATIVE
HYALINE CASTS #/AREA URNS LPF: ABNORMAL /LPF (ref 0–2)
KETONES UR STRIP.AUTO-MCNC: 40 MG/DL
LEUKOCYTE ESTERASE UR QL STRIP.AUTO: NEGATIVE
LIPASE SERPL-CCNC: 14 U/L (ref 13–60)
LYMPHOCYTES # BLD: 2 K/UL (ref 1–5.1)
LYMPHOCYTES NFR BLD: 25.4 %
MCH RBC QN AUTO: 31.8 PG (ref 26–34)
MCHC RBC AUTO-ENTMCNC: 35.1 G/DL (ref 31–36)
MCV RBC AUTO: 90.5 FL (ref 80–100)
MONOCYTES # BLD: 0.6 K/UL (ref 0–1.3)
MONOCYTES NFR BLD: 7.5 %
MUCOUS THREADS #/AREA URNS LPF: ABNORMAL /LPF
NEUTROPHILS # BLD: 5.2 K/UL (ref 1.7–7.7)
NEUTROPHILS NFR BLD: 66 %
NITRITE UR QL STRIP.AUTO: NEGATIVE
PH UR STRIP.AUTO: 6 [PH] (ref 5–8)
PLATELET # BLD AUTO: 333 K/UL (ref 135–450)
PMV BLD AUTO: 9.3 FL (ref 5–10.5)
POTASSIUM SERPL-SCNC: 3.8 MMOL/L (ref 3.5–5.1)
PROT SERPL-MCNC: 7.2 G/DL (ref 6.4–8.2)
PROT UR STRIP.AUTO-MCNC: ABNORMAL MG/DL
RBC # BLD AUTO: 5.48 M/UL (ref 4–5.2)
RBC #/AREA URNS HPF: ABNORMAL /HPF (ref 0–4)
REASON FOR REJECTION: NORMAL
REJECTED TEST: NORMAL
SARS-COV-2 RDRP RESP QL NAA+PROBE: DETECTED
SODIUM SERPL-SCNC: 137 MMOL/L (ref 136–145)
SP GR UR STRIP.AUTO: >=1.03 (ref 1–1.03)
TROPONIN, HIGH SENSITIVITY: <6 NG/L (ref 0–14)
UA COMPLETE W REFLEX CULTURE PNL UR: ABNORMAL
UA DIPSTICK W REFLEX MICRO PNL UR: YES
URN SPEC COLLECT METH UR: ABNORMAL
UROBILINOGEN UR STRIP-ACNC: 0.2 E.U./DL
WBC # BLD AUTO: 7.9 K/UL (ref 4–11)
WBC #/AREA URNS HPF: ABNORMAL /HPF (ref 0–5)

## 2024-08-09 PROCEDURE — 84703 CHORIONIC GONADOTROPIN ASSAY: CPT

## 2024-08-09 PROCEDURE — 81001 URINALYSIS AUTO W/SCOPE: CPT

## 2024-08-09 PROCEDURE — 6360000002 HC RX W HCPCS: Performed by: EMERGENCY MEDICINE

## 2024-08-09 PROCEDURE — 93005 ELECTROCARDIOGRAM TRACING: CPT | Performed by: EMERGENCY MEDICINE

## 2024-08-09 PROCEDURE — 36415 COLL VENOUS BLD VENIPUNCTURE: CPT

## 2024-08-09 PROCEDURE — 83690 ASSAY OF LIPASE: CPT

## 2024-08-09 PROCEDURE — 99284 EMERGENCY DEPT VISIT MOD MDM: CPT

## 2024-08-09 PROCEDURE — 80053 COMPREHEN METABOLIC PANEL: CPT

## 2024-08-09 PROCEDURE — 84484 ASSAY OF TROPONIN QUANT: CPT

## 2024-08-09 PROCEDURE — 87635 SARS-COV-2 COVID-19 AMP PRB: CPT

## 2024-08-09 PROCEDURE — 96374 THER/PROPH/DIAG INJ IV PUSH: CPT

## 2024-08-09 PROCEDURE — 96361 HYDRATE IV INFUSION ADD-ON: CPT

## 2024-08-09 PROCEDURE — 2580000003 HC RX 258: Performed by: EMERGENCY MEDICINE

## 2024-08-09 PROCEDURE — 85025 COMPLETE CBC W/AUTO DIFF WBC: CPT

## 2024-08-09 PROCEDURE — 93010 ELECTROCARDIOGRAM REPORT: CPT | Performed by: INTERNAL MEDICINE

## 2024-08-09 RX ORDER — 0.9 % SODIUM CHLORIDE 0.9 %
1000 INTRAVENOUS SOLUTION INTRAVENOUS ONCE
Status: COMPLETED | OUTPATIENT
Start: 2024-08-09 | End: 2024-08-09

## 2024-08-09 RX ORDER — ONDANSETRON 2 MG/ML
4 INJECTION INTRAMUSCULAR; INTRAVENOUS ONCE
Status: COMPLETED | OUTPATIENT
Start: 2024-08-09 | End: 2024-08-09

## 2024-08-09 RX ORDER — PHENOL 1.4 %
AEROSOL, SPRAY (ML) MUCOUS MEMBRANE PRN
COMMUNITY

## 2024-08-09 RX ADMIN — SODIUM CHLORIDE 1000 ML: 9 INJECTION, SOLUTION INTRAVENOUS at 10:14

## 2024-08-09 RX ADMIN — ONDANSETRON 4 MG: 2 INJECTION INTRAMUSCULAR; INTRAVENOUS at 10:16

## 2024-08-09 ASSESSMENT — PAIN - FUNCTIONAL ASSESSMENT: PAIN_FUNCTIONAL_ASSESSMENT: NONE - DENIES PAIN

## 2024-08-09 NOTE — ED PROVIDER NOTES
eMERGENCY dEPARTMENT eNCOUnter      Pt Name: Erna Franco  MRN: [id number]  Birthdate 1980  Date of evaluation: 8/9/2024  Provider: KANDY FOWLER MD     CHIEF COMPLAINT       Chief Complaint   Patient presents with    Concern For COVID-19    Abdominal Pain    Emesis    Diarrhea     Came home from traveling/cruise on 7/29.   Reports being sick when coming home on 7/29 and then got better.  Symptoms resumed about 1 week ago.  Reports intermittent generalized abd pain at 5, cough, runny nose, N/V/D.   4 episodes of diarrhea and 2 episodes of vomiting today since midnight.         HISTORY OF PRESENT ILLNESS   (Location/Symptom, Timing/Onset,Context/Setting, Quality, Duration, Modifying Factors, Severity) Note limiting factors.   HPI    Erna Franco is a 44 y.o. female who presents to the emergency department with not feeling well for about a week.  Patient states she went on a cruise to the [facility] 2 days later when she came back she started getting sickness.  Patient had some nausea vomiting and diarrhea.  Patient states it resolved for couple days and then now this past week came back.  Patient had a little fever earlier.  Just generalized weakness unable to keep anything down at watery stool.  No abdominal tenderness.  Patient did have some abdominal pain earlier.  Unable to really to eat well decreased appetite.  There was some exposure to COVID on the boat.  Patient otherwise is fairly healthy.  No history of any cardiac issues.    Nursing Notes were reviewed.    REVIEW OFSYSTEMS    (2+ for level 4; 10+ for level 5)   Review of Systems    General: Positive fevers, chills or night sweats, No weight loss    Head:  No Sore throat,  No Ear Pain    Chest:  Nontender.  No Cough, No SOB,  Chest Pain    GI: There is mild lower abdominal pain with nausea and vomiting.  Positive diarrhea    : No dysuria or hematuria    Musculoskeletal: No unrelenting pain or night pain    Neurologic: No bowel or bladder incontinence,

## 2024-08-09 NOTE — ED NOTES
Pt d/c home with AVS, she denies questions, her gait was steady at d/c no s/s of distress noted ,

## 2024-09-27 ENCOUNTER — TELEPHONE (OUTPATIENT)
Dept: ORTHOPEDIC SURGERY | Age: 44
End: 2024-09-27

## 2024-09-27 ENCOUNTER — HOSPITAL ENCOUNTER (OUTPATIENT)
Dept: GENERAL RADIOLOGY | Age: 44
Discharge: HOME | End: 2024-09-27
Payer: COMMERCIAL

## 2024-09-27 ENCOUNTER — HOSPITAL ENCOUNTER (OUTPATIENT)
Age: 44
Discharge: HOME | End: 2024-09-27
Payer: COMMERCIAL

## 2024-09-27 DIAGNOSIS — M25.552 LEFT HIP PAIN: ICD-10-CM

## 2024-09-27 PROCEDURE — 73502 X-RAY EXAM HIP UNI 2-3 VIEWS: CPT

## 2024-10-17 ENCOUNTER — OFFICE VISIT (OUTPATIENT)
Dept: ORTHOPEDIC SURGERY | Age: 44
End: 2024-10-17
Payer: COMMERCIAL

## 2024-10-17 VITALS — BODY MASS INDEX: 39.51 KG/M2 | WEIGHT: 223 LBS | HEIGHT: 63 IN

## 2024-10-17 DIAGNOSIS — M70.62 GREATER TROCHANTERIC BURSITIS OF LEFT HIP: Primary | ICD-10-CM

## 2024-10-17 PROCEDURE — 99213 OFFICE O/P EST LOW 20 MIN: CPT | Performed by: PHYSICIAN ASSISTANT

## 2024-10-17 PROCEDURE — 20610 DRAIN/INJ JOINT/BURSA W/O US: CPT | Performed by: PHYSICIAN ASSISTANT

## 2024-10-17 PROCEDURE — G8484 FLU IMMUNIZE NO ADMIN: HCPCS | Performed by: PHYSICIAN ASSISTANT

## 2024-10-17 PROCEDURE — G8427 DOCREV CUR MEDS BY ELIG CLIN: HCPCS | Performed by: PHYSICIAN ASSISTANT

## 2024-10-17 PROCEDURE — 1036F TOBACCO NON-USER: CPT | Performed by: PHYSICIAN ASSISTANT

## 2024-10-17 PROCEDURE — G8417 CALC BMI ABV UP PARAM F/U: HCPCS | Performed by: PHYSICIAN ASSISTANT

## 2024-10-17 RX ORDER — BUPIVACAINE HYDROCHLORIDE 2.5 MG/ML
2 INJECTION, SOLUTION INFILTRATION; PERINEURAL ONCE
Status: COMPLETED | OUTPATIENT
Start: 2024-10-17 | End: 2024-10-17

## 2024-10-17 RX ORDER — TRIAMCINOLONE ACETONIDE 40 MG/ML
40 INJECTION, SUSPENSION INTRA-ARTICULAR; INTRAMUSCULAR ONCE
Status: COMPLETED | OUTPATIENT
Start: 2024-10-17 | End: 2024-10-17

## 2024-10-17 RX ADMIN — TRIAMCINOLONE ACETONIDE 40 MG: 40 INJECTION, SUSPENSION INTRA-ARTICULAR; INTRAMUSCULAR at 09:38

## 2024-10-17 RX ADMIN — BUPIVACAINE HYDROCHLORIDE 5 MG: 2.5 INJECTION, SOLUTION INFILTRATION; PERINEURAL at 09:38

## 2024-10-17 NOTE — PROGRESS NOTES
visit.       No Known Allergies      Objective:     She is alert, oriented x 3, pleasant, well nourished, developed and in no acute distress.     Ht 1.588 m (5' 2.5\")   Wt 101.2 kg (223 lb)   BMI 40.14 kg/m²        Examination of the left hip shows:  No discoloration, wounds or gross deformity.  Greater trochanter/bursa palpation tender.  Anterior superior iliac spine is non-tender.  Ischial tuberosity is non-tender.  Sacroiliac joint is non-tender.  Stinchfield resisted hip flexion test negative.  FADIR negative.  Bob's negative.  Conrad's positive.  Log Roll negative.      Lower extremities:  5/5 motor strength of bilateral lower extremities.   Negative straight leg raise, bilaterally.    Deep tendon reflexes at knees and achilles are 2+.   Sensation is intact to light touch L3 to S1 bilaterally.   No clonus.      Examination of the lower extremities shows intact perfusion to both lower extremities.  No cyanosis and digigts are warm to touch, capillary refill is less than 2 seconds.   There is no edema noted.     Intact skin without lacerations or abrasions, no significant erythema, rashes or skin lesions.      X Rays: were not performed in the office today:   ONE XRAY VIEW OF THE PELVIS AND TWO XRAY VIEWS LEFT HIP  9/27/2024 2:32 pm   FINDINGS:  There is no evidence of acute fracture.  There is normal alignment.  No acute  joint abnormality.  No focal osseous lesion. No focal soft tissue  abnormality.  Hip joint spaces preserved.  IMPRESSION:  No acute osseous abnormality.  No significant degenerative changes    Additional Tests reviewed: none  Additional Outside Records reviewed: none    Diagnosis:       ICD-10-CM    1. Greater trochanteric bursitis of left hip  M70.62 DRAIN/INJECT LARGE JOINT/BURSA     BUPivacaine (MARCAINE) 0.25 % injection 5 mg     triamcinolone acetonide (KENALOG-40) injection 40 mg           Assessment and Plan:       Assessment:  Erna is a 44-year-old female who has left lateral hip

## 2025-04-23 ENCOUNTER — OFFICE VISIT (OUTPATIENT)
Dept: ORTHOPEDIC SURGERY | Age: 45
End: 2025-04-23

## 2025-04-23 VITALS — BODY MASS INDEX: 39.51 KG/M2 | HEIGHT: 63 IN | WEIGHT: 223 LBS

## 2025-04-23 DIAGNOSIS — M70.62 GREATER TROCHANTERIC BURSITIS OF LEFT HIP: Primary | ICD-10-CM

## 2025-04-23 RX ORDER — TRIAMCINOLONE ACETONIDE 40 MG/ML
40 INJECTION, SUSPENSION INTRA-ARTICULAR; INTRAMUSCULAR ONCE
Status: COMPLETED | OUTPATIENT
Start: 2025-04-23 | End: 2025-04-23

## 2025-04-23 RX ORDER — BUPIVACAINE HYDROCHLORIDE 2.5 MG/ML
2 INJECTION, SOLUTION INFILTRATION; PERINEURAL ONCE
Status: COMPLETED | OUTPATIENT
Start: 2025-04-23 | End: 2025-04-23

## 2025-04-23 RX ADMIN — TRIAMCINOLONE ACETONIDE 40 MG: 40 INJECTION, SUSPENSION INTRA-ARTICULAR; INTRAMUSCULAR at 11:20

## 2025-04-23 RX ADMIN — BUPIVACAINE HYDROCHLORIDE 5 MG: 2.5 INJECTION, SOLUTION INFILTRATION; PERINEURAL at 11:20

## 2025-04-23 NOTE — PROGRESS NOTES
performed in the office today:       Diagnosis:       ICD-10-CM    1. Greater trochanteric bursitis of left hip  M70.62 DRAIN/INJECT LARGE JOINT/BURSA     triamcinolone acetonide (KENALOG-40) injection 40 mg     BUPivacaine (MARCAINE) 0.25 % injection 5 mg           Assessment and Plan:       Assessment:  Erna is a 45-year-old female who has recurrent left lateral hip pain related to trochanteric bursitis.  Good relief with prior steroid injection last performed 10/17/2024.    I had an extensive discussion with Ms. Erna Franco regarding the natural history, etiology, and long term consequences of her condition. I have presented reasonable alternatives to the patient's proposed care, treatment, and services.  Risks and benefits of the treatment options also reviewed in detail. I have outlined a treatment plan with them. She has had full opportunity to ask her questions.  I have answered them all to her satisfaction.  I feel that Ms. Erna Franco understands our discussion today.     Plan:    Procedures- The Risks and benefits of corticosteroid injections were discussed today. She had ample time for discussion and questions were answered to her satisfaction. She verbally consented to proceed with intra-articular injection today.  Side effects of injections into the joints, muscles or spine can include but not limited to: Increased pain or discomfort for a few days, temporary bruising or collection of blood under the skin, flushing of the face, changes to your vision such as blurred vision, and infection causing redness, swelling and pain, loss of fat where the injection was given, paler skin around the site of injection.  If you are diabetic, your blood sugar level may go up for 3 days.  If you have high blood pressure, your blood pressure may go up for a few days.   Cortisone injection  PROCEDURE NOTE:  Pre op Diagnosis:  Trochanteric Bursitis  The left hip was prepped  in standard sterile fashion with  Alcohol.  2